# Patient Record
Sex: FEMALE | Race: WHITE | NOT HISPANIC OR LATINO | Employment: FULL TIME | URBAN - METROPOLITAN AREA
[De-identification: names, ages, dates, MRNs, and addresses within clinical notes are randomized per-mention and may not be internally consistent; named-entity substitution may affect disease eponyms.]

---

## 2019-08-21 ENCOUNTER — DOCUMENTATION (OUTPATIENT)
Dept: BARIATRICS/WEIGHT MGMT | Facility: CLINIC | Age: 19
End: 2019-08-21

## 2019-08-21 ENCOUNTER — OFFICE VISIT (OUTPATIENT)
Dept: BARIATRICS/WEIGHT MGMT | Facility: CLINIC | Age: 19
End: 2019-08-21

## 2019-08-21 VITALS
WEIGHT: 293 LBS | RESPIRATION RATE: 18 BRPM | HEART RATE: 104 BPM | TEMPERATURE: 98 F | SYSTOLIC BLOOD PRESSURE: 140 MMHG | DIASTOLIC BLOOD PRESSURE: 84 MMHG | OXYGEN SATURATION: 99 % | HEIGHT: 70 IN | BODY MASS INDEX: 41.95 KG/M2

## 2019-08-21 DIAGNOSIS — E66.9 DIABETES MELLITUS TYPE 2 IN OBESE (HCC): ICD-10-CM

## 2019-08-21 DIAGNOSIS — K21.9 GASTROESOPHAGEAL REFLUX DISEASE, ESOPHAGITIS PRESENCE NOT SPECIFIED: ICD-10-CM

## 2019-08-21 DIAGNOSIS — I10 ESSENTIAL HYPERTENSION: ICD-10-CM

## 2019-08-21 DIAGNOSIS — E66.01 OBESITY, CLASS III, BMI 40-49.9 (MORBID OBESITY) (HCC): ICD-10-CM

## 2019-08-21 DIAGNOSIS — R53.83 FATIGUE, UNSPECIFIED TYPE: Primary | ICD-10-CM

## 2019-08-21 DIAGNOSIS — M19.90 OSTEOARTHRITIS, UNSPECIFIED OSTEOARTHRITIS TYPE, UNSPECIFIED SITE: ICD-10-CM

## 2019-08-21 DIAGNOSIS — M54.9 BACK PAIN, UNSPECIFIED BACK LOCATION, UNSPECIFIED BACK PAIN LATERALITY, UNSPECIFIED CHRONICITY: ICD-10-CM

## 2019-08-21 DIAGNOSIS — R10.13 DYSPEPSIA: ICD-10-CM

## 2019-08-21 DIAGNOSIS — E11.69 DIABETES MELLITUS TYPE 2 IN OBESE (HCC): ICD-10-CM

## 2019-08-21 PROCEDURE — 99205 OFFICE O/P NEW HI 60 MIN: CPT | Performed by: SURGERY

## 2019-08-21 RX ORDER — SODIUM CHLORIDE 9 MG/ML
150 INJECTION, SOLUTION INTRAVENOUS CONTINUOUS
Status: CANCELLED | OUTPATIENT
Start: 2019-08-21

## 2019-08-21 RX ORDER — ESCITALOPRAM OXALATE 10 MG/1
10 TABLET ORAL DAILY
Refills: 0 | COMMUNITY
Start: 2019-07-27

## 2019-08-21 NOTE — PROGRESS NOTES
[facility]  [address]  [facility] [address]  [phone]      Patient  Name:  Shavon Martinez  :  2000      Date of Visit: 2019      Chief Complaint:  weight gain; unable to maintain weight loss    History of Present Illness:  Shavon Martinez is a 19 y.o. female who presents today for evaluation, education and consultation regarding weight loss surgery. The patient is interested in sleeve gastrectomy.     Her mother had a gastric bypass at [facility] in  and was hospitalized for 1 week in the ICU.  The patient describes large amount of intraoperative blood loss and an issue with the stapler that caused her esophagus to narrow.  She had some weight loss, but did not achieve non-obese BMI.  Reports her mom is non-compliant with vitamins.      Shavon has been overweight for at least 20 years, has been 35 pounds or more overweight for at least 20 years, has been 100 pounds or more overweight for 6 or more years and started dieting at age 11.  The patient describes their eating habits as volume eater, emotional eater, snacker, skips meals.      Previous diet attempts include: keto, stopping pop, Adipex, Slim Fast, intermittent fasting.  The most weight Shavon lost was 35 pounds on Adipex but was only able to maintain that weight loss for a short time.  Her maximum lifetime weight is 350 pounds.    As above, patient has been overweight for many years, with numerous failed dietary/weight loss attempts.  She now has obesity related comorbidities and as such has decided to pursue weight loss surgery.    All past medical, surgical, social and family history have been obtained and discussed as pertinent to bariatric surgery as below.     No personal or family hx of VTE/clotting disorder.    No hx of lung/liver/heart/lung problems.    ++GERD.  No meds and no prior workup.      Hx HTN, DM2, and kidney stones.    Past Medical History:   Diagnosis Date  "  • Allergic reaction     rash on face, happens about 1x per month   • Anxiety    • Back pain     occasional Aleve, ibuprofen, Tylenol   • Depression    • Fatigue    • GERD (gastroesophageal reflux disease)     \"all the time\" tx with milk and peppermints   • Gestational diabetes    • Hypertension    • Nephrolithiasis     a few episodes, better since off pop/caffeine   • Osteoarthritis    • Pregnancy induced hypertension    • Type 2 diabetes mellitus (CMS/HCC)     on metformin.  Dx with prediabetes in middle school.     Past Surgical History:   Procedure Laterality Date   •  SECTION  2018   • LAPAROSCOPIC CHOLECYSTECTOMY      poor function, Dr. García   • MOUTH SURGERY      as a child, unsure what she had done       No Known Allergies    Current Outpatient Medications:   •  escitalopram (LEXAPRO) 10 MG tablet, Take 10 mg by mouth Daily., Disp: , Rfl: 0  •  levonorgestrel (MIRENA, 52 MG,) 20 MCG/24HR IUD, Mirena 20 mcg/24 hours (5 yrs) 52 mg intrauterine device  Take by intrauterine route., Disp: , Rfl:   •  metFORMIN (GLUCOPHAGE) 500 MG tablet, Take 500 mg by mouth 2 (Two) Times a Day With Meals., Disp: , Rfl: 0    Social History     Socioeconomic History   • Marital status: Single     Spouse name: Not on file   • Number of children: Not on file   • Years of education: Not on file   • Highest education level: Not on file   Tobacco Use   • Smoking status: Never Smoker   • Smokeless tobacco: Never Used   • Tobacco comment: not around secondhand smoke in house or car   Substance and Sexual Activity   • Alcohol use: Yes     Frequency: Monthly or less     Drinks per session: 1 or 2   • Drug use: No   Social History Narrative    Stay at home mom, studying for Associate's degree in science.  Lives with parents, sister, her child, her significant other.     Family History   Problem Relation Age of Onset   • Heart attack Maternal Grandmother 52       Review of Systems   Constitutional: Positive for fatigue and " unexpected weight gain. Negative for chills, diaphoresis, fever and unexpected weight loss.   HENT: Negative for congestion and facial swelling.    Eyes: Negative for blurred vision, double vision and discharge.   Respiratory: Negative for chest tightness, shortness of breath and stridor.    Cardiovascular: Negative for chest pain, palpitations and leg swelling.   Gastrointestinal: Negative for blood in stool.   Endocrine: Negative for polydipsia.   Genitourinary: Negative for hematuria.   Musculoskeletal: Positive for arthralgias.   Skin: Negative for color change.   Allergic/Immunologic: Negative for immunocompromised state.   Neurological: Negative for confusion.   Psychiatric/Behavioral: Negative for self-injury.        Physical Exam:  Vital Signs:  Weight: (!) 142 kg (314 lb)   Body mass index is 45.05 kg/m².  Temp: 98 °F (36.7 °C)   Heart Rate: 104   BP: 140/84     Physical Exam   Constitutional: She is oriented to person, place, and time. She appears well-developed and well-nourished.   HENT:   Head: Normocephalic and atraumatic.   Nose: Nose normal.   Eyes: Conjunctivae and EOM are normal. Pupils are equal, round, and reactive to light.   Neck: Normal range of motion. Neck supple. Carotid bruit is not present. No tracheal deviation present. No thyromegaly present.   Cardiovascular: Normal rate, regular rhythm and normal heart sounds.   Pulmonary/Chest: Effort normal and breath sounds normal. No respiratory distress.   Abdominal: Soft. She exhibits no distension. There is no hepatosplenomegaly. There is no tenderness.   Lap scars, low transverse C section   Musculoskeletal: Normal range of motion. She exhibits no edema or deformity.   Neurological: She is alert and oriented to person, place, and time. No cranial nerve deficit. Coordination normal.   Skin: Skin is warm and dry. No rash noted.   Psychiatric: She has a normal mood and affect. Her behavior is normal. Judgment and thought content normal.   Vitals  reviewed.      There is no problem list on file for this patient.      Assessment:    Shavon Martinez is a 19 y.o. year old female with medically complicated obesity pursuing sleeve gastrectomy.    Weight loss surgery is deemed medically necessary given the following obesity related comorbidities including diabetes, hypertension, osteoarthritis, back pain, GERD and depression with current Weight: (!) 142 kg (314 lb) and Body mass index is 45.05 kg/m²..    She is a good candidate for weight loss surgery pending further evaluation.    Plan:  The consultation plan and program requirements were reviewed with the patient.  The patient has been advised that a letter of medical support must be obtained from her primary care physician or referring provider. A psychological evaluation will be arranged.  A nutritional evaluation will be performed.  The patient was advised to start a high protein and low carbohydrate diet.  Necessary lifestyle modifications were discussed.  Instructions on how to access RAUL was given to the patient.  RAUL is an internet based educational video that explains the surgical procedure chosen and answers basic questions regarding that procedure.     Preoperative testing will include: CBC, CMP, Lipids, TSH, HgA1C, H.Pylori, CXR, EKG and EGD     Preop clearances required prior to surgery will include Cardiac.      Patient understands that bariatric surgery is not cosmetic surgery but rather a tool to help make a lifelong commitment to lifestyle changes including diet, exercise, behavior modifications, and healthy habits.  The patient has been educated on expected postoperative lifestyle changes, including commitment to high protein diet, vitamin regimen, and exercise program.  They are aware that support groups are encouraged for optimal weight loss results.        MDM high:  Elective procedure with the following risk factors: morbid obesity, DM2, HTN  4+ chronic medical problems reviewed.    EGD  with biopsy.  Pt instructed to adhere to NPO after midnight, full liquids for 24 hours before procedure.      The risks and benefits of the upper endoscopy were discussed with the patient in detail and all questions were answered.  Possibility of perforation, bleeding, aspiration, and anesthesia reaction were reviewed.  Patient agrees to proceed.          Melissa Vergara MD

## 2019-08-21 NOTE — PROGRESS NOTES
Weight Loss Surgery  Presurgical Nutrition Assessment     Shavon Martinez  08/21/2019  [id number]  [id number]  2000  female    Surgery desired: Sleeve Gastrectomy    Weight: (!) 142 kg (314 lb)   Body mass index is 45.05 kg/m².  No past medical history on file.  No past surgical history on file.  No Known Allergies    Current Outpatient Medications:   •  escitalopram (LEXAPRO) 10 MG tablet, Take 10 mg by mouth Daily., Disp: , Rfl: 0  •  levonorgestrel (MIRENA, 52 MG,) 20 MCG/24HR IUD, Mirena 20 mcg/24 hours (5 yrs) 52 mg intrauterine device  Take by intrauterine route., Disp: , Rfl:   •  metFORMIN (GLUCOPHAGE) 500 MG tablet, Take 500 mg by mouth 2 (Two) Times a Day With Meals., Disp: , Rfl: 0      Nutrition Assessment    Estimated energy needs: 2500    Estimated calories for weight loss: 1900    IBW (Pounds):  190      Excess body weight (Pounds): 124       Nutrition Recall  24 Hour recall: (B) (L) (D) -  Reviewed and discussed with patient       Exercise  rarely      Education    Provided manual for Sleeve Gastrectomy and reviewed necessary vitamin and protein supplementation for surgery.     Provided follow-up options for support, including contact information for dietitians here, if desired.  Web-based support information and apps for smart phones and computers given.  Noted that support group is offered at this clinic, and that support is associated with weight loss.    Recommend that team follow per protocol.      Nutrition Goals   Dietary Guidelines per manual  Protein goal:  grams per day     Exercise Goals  Add 15-30 minutes of activity per day as tolerated        Nina Finn RD  08/21/2019  10:48 AM

## 2019-08-22 LAB
ALBUMIN SERPL-MCNC: 4.2 G/DL (ref 3.5–5.5)
ALBUMIN/GLOB SERPL: 1.6 {RATIO} (ref 1.2–2.2)
ALP SERPL-CCNC: 116 IU/L (ref 39–117)
ALT SERPL-CCNC: 20 IU/L (ref 0–32)
AST SERPL-CCNC: 17 IU/L (ref 0–40)
BASOPHILS # BLD AUTO: 0 X10E3/UL (ref 0–0.2)
BASOPHILS NFR BLD AUTO: 0 %
BILIRUB SERPL-MCNC: <0.2 MG/DL (ref 0–1.2)
BUN SERPL-MCNC: 10 MG/DL (ref 6–20)
BUN/CREAT SERPL: 18 (ref 9–23)
CALCIUM SERPL-MCNC: 9.6 MG/DL (ref 8.7–10.2)
CHLORIDE SERPL-SCNC: 104 MMOL/L (ref 96–106)
CHOLEST SERPL-MCNC: 165 MG/DL (ref 100–199)
CO2 SERPL-SCNC: 20 MMOL/L (ref 20–29)
CREAT SERPL-MCNC: 0.55 MG/DL (ref 0.57–1)
EOSINOPHIL # BLD AUTO: 0.3 X10E3/UL (ref 0–0.4)
EOSINOPHIL NFR BLD AUTO: 2 %
ERYTHROCYTE [DISTWIDTH] IN BLOOD BY AUTOMATED COUNT: 15 % (ref 12.3–15.4)
GLOBULIN SER CALC-MCNC: 2.6 G/DL (ref 1.5–4.5)
GLUCOSE SERPL-MCNC: 93 MG/DL (ref 65–99)
HBA1C MFR BLD: 5.7 % (ref 4.8–5.6)
HCT VFR BLD AUTO: 42.1 % (ref 34–46.6)
HDLC SERPL-MCNC: 51 MG/DL
HGB BLD-MCNC: 13.6 G/DL (ref 11.1–15.9)
IMM GRANULOCYTES # BLD AUTO: 0 X10E3/UL (ref 0–0.1)
IMM GRANULOCYTES NFR BLD AUTO: 0 %
LDLC SERPL CALC-MCNC: 97 MG/DL (ref 0–99)
LYMPHOCYTES # BLD AUTO: 4.9 X10E3/UL (ref 0.7–3.1)
LYMPHOCYTES NFR BLD AUTO: 30 %
MCH RBC QN AUTO: 25.6 PG (ref 26.6–33)
MCHC RBC AUTO-ENTMCNC: 32.3 G/DL (ref 31.5–35.7)
MCV RBC AUTO: 79 FL (ref 79–97)
MONOCYTES # BLD AUTO: 0.9 X10E3/UL (ref 0.1–0.9)
MONOCYTES NFR BLD AUTO: 6 %
NEUTROPHILS # BLD AUTO: 10.5 X10E3/UL (ref 1.4–7)
NEUTROPHILS NFR BLD AUTO: 62 %
PLATELET # BLD AUTO: 324 X10E3/UL (ref 150–450)
POTASSIUM SERPL-SCNC: 4.9 MMOL/L (ref 3.5–5.2)
PROT SERPL-MCNC: 6.8 G/DL (ref 6–8.5)
RBC # BLD AUTO: 5.31 X10E6/UL (ref 3.77–5.28)
SODIUM SERPL-SCNC: 138 MMOL/L (ref 134–144)
TRIGL SERPL-MCNC: 83 MG/DL (ref 0–149)
TSH SERPL DL<=0.005 MIU/L-ACNC: 3.91 UIU/ML (ref 0.45–4.5)
UREA BREATH TEST QL: NEGATIVE
VLDLC SERPL CALC-MCNC: 17 MG/DL (ref 5–40)
WBC # BLD AUTO: 16.7 X10E3/UL (ref 3.4–10.8)

## 2019-08-28 ENCOUNTER — CONSULT (OUTPATIENT)
Dept: CARDIOLOGY | Facility: CLINIC | Age: 19
End: 2019-08-28

## 2019-08-28 VITALS
SYSTOLIC BLOOD PRESSURE: 122 MMHG | DIASTOLIC BLOOD PRESSURE: 88 MMHG | HEIGHT: 70 IN | HEART RATE: 93 BPM | OXYGEN SATURATION: 97 % | WEIGHT: 293 LBS | BODY MASS INDEX: 41.95 KG/M2

## 2019-08-28 DIAGNOSIS — Z01.811 PRE-OP CHEST EXAM: Primary | ICD-10-CM

## 2019-08-28 PROCEDURE — 99243 OFF/OP CNSLTJ NEW/EST LOW 30: CPT | Performed by: PHYSICIAN ASSISTANT

## 2019-08-28 PROCEDURE — 93000 ELECTROCARDIOGRAM COMPLETE: CPT | Performed by: PHYSICIAN ASSISTANT

## 2019-08-28 NOTE — PROGRESS NOTES
[facility]  INITIAL OFFICE CONSULT      Shavon Martinez  2000  PCP: Manuel Schmidt MD    SUBJECTIVE:   Shavon Martinez is a 19 y.o. female seen for a consultation visit regarding the following:     Chief Complaint:   Chief Complaint   Patient presents with   • Bariatric clearance     CONSULT          Consultation is requested by Melissa Vergara MD for evaluation of Bariatric clearance (CONSULT)        History:  19-year-old female seen in consultation regarding need for cardiac evaluation prior to bariatric surgery.  The patient denies any previous cardiac history.  She denies any hospitalization with exception of her  for her child over a year ago and gallbladder surgery when she was 13 years old.  She states she did well with these 2 surgeries.  She reports that after her child was born she her weight peaked at over 350 pounds.  Despite dieting and trying to increase her activity levels she continues to have difficulty with losing weight she is now pursuing bariatric surgery.  She reports that she has had elevated blood pressure times but currently not taking medications.  She is also been diagnosed with prediabetes and actually is on metformin therapy at this time.  Her last HbA1c was 5.7.  She reports no episodes of chest pain or chest tightness suggesting angina pectoris.  She denies any heart failure symptoms such as orthopnea PND peripheral knee.  She denies any arrhythmia issues,dizziness, near syncope, or syncope.  She states she does not exercise all that regular basis that she is taking care of her 1-year-old child but she does do a lot of her own housework is able to run up a flight of stairs vacuum and routine work with not any symptoms of chest pain or shortness of breath.  She is scheduled to start a new protein diet as per protocol with the bariatric surgery and looks to schedule this surgery in the near future.      Cardiac PMH: (Old  "records have been reviewed and summarized below)  1. Pre HTN SBP 130mmHg  2. DM Type II:  HbA1c 5.7.        Past Medical History, Past Surgical History, Family history, Social History, and Medications were all reviewed with the patient today and updated as necessary.     Current Outpatient Medications   Medication Sig Dispense Refill   • escitalopram (LEXAPRO) 10 MG tablet Take 10 mg by mouth Daily.  0   • levonorgestrel (MIRENA, 52 MG,) 20 MCG/24HR IUD Mirena 20 mcg/24 hours (5 yrs) 52 mg intrauterine device   Take by intrauterine route.     • metFORMIN (GLUCOPHAGE) 500 MG tablet Take 500 mg by mouth 2 (Two) Times a Day With Meals.  0     No current facility-administered medications for this visit.      No Known Allergies      Past Medical History:   Diagnosis Date   • Allergic reaction     rash on face, happens about 1x per month   • Anxiety    • Back pain     occasional Aleve, ibuprofen, Tylenol   • Depression    • Fatigue    • GERD (gastroesophageal reflux disease)     \"all the time\" tx with milk and peppermints   • Gestational diabetes    • Hypertension    • Nephrolithiasis     a few episodes, better since off pop/caffeine   • Pregnancy induced hypertension    • Type 2 diabetes mellitus (CMS/HCC)     on metformin.  Dx with prediabetes in middle school.     Past Surgical History:   Procedure Laterality Date   •  SECTION  2018   • LAPAROSCOPIC CHOLECYSTECTOMY      poor function, Dr. García   • MOUTH SURGERY      as a child, unsure what she had done   • TONSILLECTOMY       Family History   Problem Relation Age of Onset   • Heart attack Maternal Grandmother 52   • Hypertension Father    • Hypertension Brother      Social History     Tobacco Use   • Smoking status: Never Smoker   • Smokeless tobacco: Never Used   • Tobacco comment: not around secondhand smoke in house or car   Substance Use Topics   • Alcohol use: Yes     Alcohol/week: 0.6 - 1.2 oz     Types: 1 - 2 Glasses of wine per week     " "Frequency: Monthly or less     Drinks per session: 1 or 2     Comment: monthly       ROS:  Review of Symptoms:  General: no recent weight loss/gain, weakness or fatigue  Skin: no rashes, lumps, or other skin changes  HEENT: no dizziness, lightheadedness, or vision changes  Respiratory: no cough or hemoptysis  Cardiovascular: no palpitations, and tachycardia  Gastrointestinal: no black/tarry stools or diarrhea  Urinary: no change in frequency or urgency  Peripheral Vascular: no claudication or leg cramps  Musculoskeletal: no muscle or joint pain/stiffness  Psychiatric: no depression or excessive stress  Neurological: no sensory or motor loss, no syncope  Hematologic: no anemia, easy bruising or bleeding  Endocrine: no thyroid problems, nor heat or cold intolerance         PHYSICAL EXAM:   /88 (BP Location: Left arm, Patient Position: Sitting)   Pulse 93   Ht 177.8 cm (70\")   Wt (!) 144 kg (316 lb 12.8 oz)   SpO2 97%   BMI 45.46 kg/m²      Wt Readings from Last 5 Encounters:   08/28/19 (!) 144 kg (316 lb 12.8 oz)   08/21/19 (!) 142 kg (314 lb)     BP Readings from Last 5 Encounters:   08/28/19 122/88   08/21/19 140/84       General-Well Nourished, Well developed  Eyes - PERRLA  Neck- supple, No mass  CV- regular rate and rhythm, no MRG  Lung- clear bilaterally  Abd- soft, +BS  Musc/skel - Norm strength and range of motion  Skin- warm and dry  Neuro - Alert & Oriented x 3, appropriate mood.    Medical problems and test results were reviewed with the patient today.     Results for orders placed or performed in visit on 08/21/19   H. Pylori Breath Test - Breath, Lung   Result Value Ref Range    H. pylori Breath Test Negative Negative   Comprehensive Metabolic Panel   Result Value Ref Range    Glucose 93 65 - 99 mg/dL    BUN 10 6 - 20 mg/dL    Creatinine 0.55 (L) 0.57 - 1.00 mg/dL    eGFR Non African Am 135 >59 mL/min/1.73    eGFR African Am 155 >59 mL/min/1.73    BUN/Creatinine Ratio 18 9 - 23    Sodium 138 " 134 - 144 mmol/L    Potassium 4.9 3.5 - 5.2 mmol/L    Chloride 104 96 - 106 mmol/L    Total CO2 20 20 - 29 mmol/L    Calcium 9.6 8.7 - 10.2 mg/dL    Total Protein 6.8 6.0 - 8.5 g/dL    Albumin 4.2 3.5 - 5.5 g/dL    Globulin 2.6 1.5 - 4.5 g/dL    A/G Ratio 1.6 1.2 - 2.2    Total Bilirubin <0.2 0.0 - 1.2 mg/dL    Alkaline Phosphatase 116 39 - 117 IU/L    AST (SGOT) 17 0 - 40 IU/L    ALT (SGPT) 20 0 - 32 IU/L   TSH   Result Value Ref Range    TSH 3.910 0.450 - 4.500 uIU/mL   Lipid Panel   Result Value Ref Range    Total Cholesterol 165 100 - 199 mg/dL    Triglycerides 83 0 - 149 mg/dL    HDL Cholesterol 51 >39 mg/dL    VLDL Cholesterol 17 5 - 40 mg/dL    LDL Cholesterol  97 0 - 99 mg/dL   Hemoglobin A1c   Result Value Ref Range    Hemoglobin A1C 5.7 (H) 4.8 - 5.6 %   CBC & Differential   Result Value Ref Range    WBC 16.7 (H) 3.4 - 10.8 x10E3/uL    RBC 5.31 (H) 3.77 - 5.28 x10E6/uL    Hemoglobin 13.6 11.1 - 15.9 g/dL    Hematocrit 42.1 34.0 - 46.6 %    MCV 79 79 - 97 fL    MCH 25.6 (L) 26.6 - 33.0 pg    MCHC 32.3 31.5 - 35.7 g/dL    RDW 15.0 12.3 - 15.4 %    Platelets 324 150 - 450 x10E3/uL    Neutrophil Rel % 62 Not Estab. %    Lymphocyte Rel % 30 Not Estab. %    Monocyte Rel % 6 Not Estab. %    Eosinophil Rel % 2 Not Estab. %    Basophil Rel % 0 Not Estab. %    Neutrophils Absolute 10.5 (H) 1.4 - 7.0 x10E3/uL    Lymphocytes Absolute 4.9 (H) 0.7 - 3.1 x10E3/uL    Monocytes Absolute 0.9 0.1 - 0.9 x10E3/uL    Eosinophils Absolute 0.3 0.0 - 0.4 x10E3/uL    Basophils Absolute 0.0 0.0 - 0.2 x10E3/uL    Immature Granulocyte Rel % 0 Not Estab. %    Immature Grans Absolute 0.0 0.0 - 0.1 x10E3/uL         Lab Results   Component Value Date    HDL 51 08/21/2019    LDL 97 08/21/2019    VLDL 17 08/21/2019       EKG:  (EKG/Tracing has been independently visualized by me and summarized below)      ECG 12 Lead  Date/Time: 8/28/2019 2:16 PM  Performed by: Phani Charlton PA  Authorized by: Phani Charlton PA   Comparison: not  compared with previous ECG   Rhythm: sinus rhythm  Rate: normal  Conduction: conduction normal  ST Segments: ST segments normal  T Waves: T waves normal  QRS axis: normal            ASSESSMENT   1. Pre op evaluation for Bariatric surgery:  Normal EKg, No prior cardiac history with no symptoms of angina, CHF, Palpitations.   2. Pre HTN: Follow DASH diet.  Increase exercise  3. DM Type II    PLAN  · The patient has a normal EKG no's cardiac symptoms.  She would consider standard cardiovascular risk to pursue bariatric surgery.  · Continue to focus on risk factor modification including following DASH diet increase exercise and monitor closely diabetes.  · Return for follow-up as needed basis.            Cardiology/Electrophysiology  08/28/19  10:03 AM  Will Latesha CORREA

## 2019-09-18 ENCOUNTER — LAB REQUISITION (OUTPATIENT)
Dept: LAB | Facility: HOSPITAL | Age: 19
End: 2019-09-18

## 2019-09-18 DIAGNOSIS — K21.9 GASTRO-ESOPHAGEAL REFLUX DISEASE WITHOUT ESOPHAGITIS: ICD-10-CM

## 2019-09-18 PROCEDURE — 88305 TISSUE EXAM BY PATHOLOGIST: CPT | Performed by: SURGERY

## 2019-09-19 LAB
CYTO UR: NORMAL
LAB AP CASE REPORT: NORMAL
LAB AP CLINICAL INFORMATION: NORMAL
PATH REPORT.FINAL DX SPEC: NORMAL
PATH REPORT.GROSS SPEC: NORMAL

## 2021-03-23 ENCOUNTER — BULK ORDERING (OUTPATIENT)
Dept: CASE MANAGEMENT | Facility: OTHER | Age: 21
End: 2021-03-23

## 2021-03-23 DIAGNOSIS — Z23 IMMUNIZATION DUE: ICD-10-CM

## 2022-11-17 NOTE — PROGRESS NOTES
"Patient: Shavon Martinez    YOB: 2000    Date: 11/18/2022    Primary Care Provider: Irina Mcmahon PA    Chief Complaint   Patient presents with   • Hernia     Hiatal       SUBJECTIVE:    History of present illness:  I saw the patient in the office  today as a consultation for evaluation and treatment of heartburn.  Patient had an EGD a few years ago when she was considering a gastric sleeve, she had a hiatal hernia at that time with no esophagitis.  She did not undergo gastric sleeve resection.  Patient continues to gain weight and have significant heart\" worse.  Patient is on PPI medication daily.  Avoid caffeine alcohol and nicotine.    The following portions of the patient's history were reviewed and updated as appropriate: allergies, current medications, past family history, past medical history, past social history, past surgical history and problem list.    Review of Systems   Constitutional: Negative for chills, fever and unexpected weight change.   HENT: Negative for hearing loss, trouble swallowing and voice change.    Eyes: Negative for visual disturbance.   Respiratory: Negative for apnea, cough, chest tightness, shortness of breath and wheezing.    Cardiovascular: Negative for chest pain, palpitations and leg swelling.   Gastrointestinal: Negative for abdominal distention, abdominal pain, anal bleeding, blood in stool, constipation, diarrhea, nausea, rectal pain and vomiting.   Endocrine: Negative for cold intolerance and heat intolerance.   Genitourinary: Negative for difficulty urinating, dysuria and flank pain.   Musculoskeletal: Negative for back pain and gait problem.   Skin: Negative for color change, rash and wound.   Neurological: Negative for dizziness, syncope, speech difficulty, weakness, light-headedness, numbness and headaches.   Hematological: Negative for adenopathy. Does not bruise/bleed easily.   Psychiatric/Behavioral: Negative for confusion. The patient is not " "nervous/anxious.        History:  Past Medical History:   Diagnosis Date   • Allergic reaction     rash on face, happens about 1x per month   • Anxiety    • Back pain     occasional Aleve, ibuprofen, Tylenol   • Depression    • Fatigue    • GERD (gastroesophageal reflux disease)     \"all the time\" tx with milk and peppermints   • Gestational diabetes    • Hypertension    • Nephrolithiasis     a few episodes, better since off pop/caffeine   • Pregnancy induced hypertension    • Type 2 diabetes mellitus (HCC)     on metformin.  Dx with prediabetes in middle school.       Past Surgical History:   Procedure Laterality Date   •  SECTION  2018   • LAPAROSCOPIC CHOLECYSTECTOMY      poor function, Dr. García   • MOUTH SURGERY      as a child, unsure what she had done   • TONSILLECTOMY         Family History   Problem Relation Age of Onset   • Heart attack Maternal Grandmother 52   • Hypertension Father    • Hypertension Brother        Social History     Tobacco Use   • Smoking status: Never   • Smokeless tobacco: Never   • Tobacco comments:     not around secondhand smoke in house or car   Substance Use Topics   • Alcohol use: Yes     Alcohol/week: 1.0 - 2.0 standard drink     Types: 1 - 2 Glasses of wine per week     Comment: monthly   • Drug use: No       Allergies:  No Known Allergies    Medications:    Current Outpatient Medications:   •  levonorgestrel (MIRENA) 20 MCG/24HR IUD, Mirena 20 mcg/24 hours (5 yrs) 52 mg intrauterine device  Take by intrauterine route., Disp: , Rfl:   •  escitalopram (LEXAPRO) 10 MG tablet, Take 10 mg by mouth Daily., Disp: , Rfl: 0  •  HYDROcodone-acetaminophen (NORCO)  MG per tablet, hydrocodone 10 mg-acetaminophen 325 mg tablet  TAKE 1 TABLET BY MOUTH EVERY 4 HOURS AS NEEDED FOR PAIN, Disp: , Rfl:   •  ibuprofen (ADVIL,MOTRIN) 800 MG tablet, ibuprofen 800 mg tablet  TAKE ONE TABLET EVERY 6 HOURS AS NEEDED PAIN, Disp: , Rfl:   •  metFORMIN (GLUCOPHAGE) 500 MG tablet, " "Take 500 mg by mouth 2 (Two) Times a Day With Meals., Disp: , Rfl: 0  •  ondansetron ODT (ZOFRAN-ODT) 8 MG disintegrating tablet, , Disp: , Rfl:   •  pantoprazole (PROTONIX) 40 MG EC tablet, pantoprazole 40 mg tablet,delayed release, Disp: , Rfl:   •  phentermine (ADIPEX-P) 37.5 MG tablet, phentermine 37.5 mg tablet  TAKE ONE TABLET DAILY, Disp: , Rfl:     OBJECTIVE:    Vital Signs:   Vitals:    11/18/22 1412   BP: 128/78   Pulse: 95   Temp: 97.8 °F (36.6 °C)   SpO2: 98%   Weight: 111 kg (245 lb)   Height: 177.8 cm (70\")       Physical Exam:   General Appearance:    Alert, cooperative, in no acute distress   Head:    Normocephalic, without obvious abnormality, atraumatic   Eyes:            Lids and lashes normal, conjunctivae and sclerae normal, no   icterus, no pallor, corneas clear, PERRLA   Ears:    Ears appear intact with no abnormalities noted   Throat:   No oral lesions, no thrush, oral mucosa moist   Neck:   No adenopathy, supple, trachea midline, no thyromegaly, no   carotid bruit, no JVD   Lungs:     Clear to auscultation,respirations regular, even and                  unlabored    Heart:    Regular rhythm and normal rate, normal S1 and S2, no            murmur, no gallop, no rub, no click   Chest Wall:    No abnormalities observed   Abdomen:     Normal bowel sounds, no masses, no organomegaly, soft        non-tender, non-distended, no guarding, there is evidence of epigastric  Tenderness.  No abdominal wall masses or hernias.   Extremities:   Moves all extremities well, no edema, no cyanosis, no             redness   Pulses:   Pulses palpable and equal bilaterally   Skin:   No bleeding, bruising or rash   Lymph nodes:   No palpable adenopathy   Neurologic:   Cranial nerves 2 - 12 grossly intact, sensation intact     Results Review:   I reviewed the patient's new clinical results.    Review of Systems was reviewed and confirmed as accurate as documented by the MA.    ASSESSMENT/PLAN:    1. Gastroesophageal " reflux disease, unspecified whether esophagitis present    2. Epigastric pain        I did have a detailed and extensive discussion with the patient in the office and they understand that they need to undergo upper endoscopy. Full risks and benefits of operative versus nonoperative intervention were discussed with the patient and these include bleeding and esophageal injury. The patient understands, agrees, and wishes to proceed with the surgical treatment plan as mentioned above. The patient had no questions for me at the end of the discussion.  Continue PPI medication daily, avoid caffeine alcohol and nicotine.  I would also encourage patient to revisit the prospect of undergoing gastric sleeve with Dr. Vergara.     I discussed the patients findings and my recommendations with patient.     Electronically signed by Amparo Mayo MD  11/18/22 13:57 EST

## 2022-11-18 ENCOUNTER — OFFICE VISIT (OUTPATIENT)
Dept: SURGERY | Facility: CLINIC | Age: 22
End: 2022-11-18

## 2022-11-18 VITALS
HEIGHT: 70 IN | OXYGEN SATURATION: 98 % | TEMPERATURE: 97.8 F | WEIGHT: 245 LBS | SYSTOLIC BLOOD PRESSURE: 128 MMHG | HEART RATE: 95 BPM | BODY MASS INDEX: 35.07 KG/M2 | DIASTOLIC BLOOD PRESSURE: 78 MMHG

## 2022-11-18 DIAGNOSIS — R10.13 EPIGASTRIC PAIN: ICD-10-CM

## 2022-11-18 DIAGNOSIS — K21.9 GASTROESOPHAGEAL REFLUX DISEASE, UNSPECIFIED WHETHER ESOPHAGITIS PRESENT: Primary | ICD-10-CM

## 2022-11-18 PROCEDURE — 99203 OFFICE O/P NEW LOW 30 MIN: CPT | Performed by: SURGERY

## 2022-11-18 RX ORDER — IBUPROFEN 800 MG/1
TABLET ORAL
COMMUNITY

## 2022-11-18 RX ORDER — PANTOPRAZOLE SODIUM 40 MG/1
TABLET, DELAYED RELEASE ORAL
COMMUNITY

## 2022-11-18 RX ORDER — HYDROCODONE BITARTRATE AND ACETAMINOPHEN 10; 325 MG/1; MG/1
TABLET ORAL
COMMUNITY

## 2022-11-18 RX ORDER — PHENTERMINE HYDROCHLORIDE 37.5 MG/1
TABLET ORAL
COMMUNITY

## 2022-11-18 RX ORDER — ONDANSETRON 8 MG/1
TABLET, ORALLY DISINTEGRATING ORAL
COMMUNITY
Start: 2022-09-27

## 2022-11-23 ENCOUNTER — TELEPHONE (OUTPATIENT)
Dept: SURGERY | Facility: CLINIC | Age: 22
End: 2022-11-23

## 2022-11-29 ENCOUNTER — OUTSIDE FACILITY SERVICE (OUTPATIENT)
Dept: SURGERY | Facility: CLINIC | Age: 22
End: 2022-11-29

## 2022-11-29 PROCEDURE — 43239 EGD BIOPSY SINGLE/MULTIPLE: CPT | Performed by: SURGERY

## 2022-12-06 NOTE — PROGRESS NOTES
Patient: Shavon Martinez    YOB: 2000    Date: 12/07/2022    Primary Care Provider: Irina Mcmahon PA    Reason for Consultation: Follow-up EGD    Chief Complaint   Patient presents with   • Post-op Follow-up     egd       History of present illness:  I saw the patient in the office today as a followup from their recent EGD with biopsy, the pathology report did show eosinophilic esophagitis .  They state that they have done well.  Patient continues to have heartburn symptoms.  No significant active esophagitis, significant eosinophilic esophagitis present.    The following portions of the patient's history were reviewed and updated as appropriate: allergies, current medications, past family history, past medical history, past social history, past surgical history and problem list.    Review of Systems   Constitutional: Negative for chills, fever and unexpected weight change.   HENT: Negative for hearing loss, trouble swallowing and voice change.    Eyes: Negative for visual disturbance.   Respiratory: Negative for apnea, cough, chest tightness, shortness of breath and wheezing.    Cardiovascular: Negative for chest pain, palpitations and leg swelling.   Gastrointestinal: Negative for abdominal distention, abdominal pain, anal bleeding, blood in stool, constipation, diarrhea, nausea, rectal pain and vomiting.   Endocrine: Negative for cold intolerance and heat intolerance.   Genitourinary: Negative for difficulty urinating, dysuria and flank pain.   Musculoskeletal: Negative for back pain and gait problem.   Skin: Negative for color change, rash and wound.   Neurological: Negative for dizziness, syncope, speech difficulty, weakness, light-headedness, numbness and headaches.   Hematological: Negative for adenopathy. Does not bruise/bleed easily.   Psychiatric/Behavioral: Negative for confusion. The patient is not nervous/anxious.        Vital Signs:   Vitals:    12/07/22 0931   BP: 124/88   Pulse:  "88   Resp: 14   Temp: 97 °F (36.1 °C)   SpO2: 99%   Weight: (!) 154 kg (339 lb)   Height: 180.3 cm (71\")       Allergies:  No Known Allergies    Medications:    Current Outpatient Medications:   •  HYDROcodone-acetaminophen (NORCO)  MG per tablet, hydrocodone 10 mg-acetaminophen 325 mg tablet  TAKE 1 TABLET BY MOUTH EVERY 4 HOURS AS NEEDED FOR PAIN, Disp: , Rfl:   •  ibuprofen (ADVIL,MOTRIN) 800 MG tablet, ibuprofen 800 mg tablet  TAKE ONE TABLET EVERY 6 HOURS AS NEEDED PAIN, Disp: , Rfl:   •  ibuprofen (ADVIL,MOTRIN) 800 MG tablet, Take 800 mg by mouth., Disp: , Rfl:   •  levonorgestrel (MIRENA) 20 MCG/24HR IUD, Mirena 20 mcg/24 hours (5 yrs) 52 mg intrauterine device  Take by intrauterine route., Disp: , Rfl:   •  ondansetron ODT (ZOFRAN-ODT) 8 MG disintegrating tablet, , Disp: , Rfl:   •  calcium carbonate (OS-JENS) 1250 (500 Ca) MG chewable tablet, Chew 1 tablet Daily., Disp: , Rfl:   •  ciprofloxacin (CIPRO) 500 MG tablet, TAKE 1 TABLET BY MOUTH TWICE A DAY FOR 5 DAYS, Disp: , Rfl:   •  diazePAM (VALIUM) 10 MG tablet, TAKE 1 TABLET BY MOUTH EVERY 6 HOURS AS NEEDED FOR ANXIETY (TAKE 1 TABLET 30 MINUTES BEFORE PROCEDURE) FOR UP TO 10 DAYS, Disp: , Rfl:   •  escitalopram (LEXAPRO) 10 MG tablet, Take 10 mg by mouth Daily., Disp: , Rfl: 0  •  metFORMIN (GLUCOPHAGE) 500 MG tablet, Take 500 mg by mouth 2 (Two) Times a Day With Meals., Disp: , Rfl: 0  •  oxyCODONE-acetaminophen (PERCOCET)  MG per tablet, TAKE 1 TABLET BY MOUTH EVERY 4 HOURS AS NEEDED FOR PAIN FOR UP TO 10 DAYS. MAX OF 6 TABLETS PER DAY, Disp: , Rfl:   •  pantoprazole (PROTONIX) 40 MG EC tablet, pantoprazole 40 mg tablet,delayed release, Disp: , Rfl:   •  phenazopyridine (PYRIDIUM) 100 MG tablet, Take 100 mg by mouth 3 (Three) Times a Day As Needed., Disp: , Rfl:   •  phentermine (ADIPEX-P) 37.5 MG tablet, phentermine 37.5 mg tablet  TAKE ONE TABLET DAILY, Disp: , Rfl:   •  promethazine (PHENERGAN) 25 MG tablet, Take 25 mg by mouth Every 6 " (Six) Hours As Needed. for nausea, Disp: , Rfl:     Physical Exam:   General Appearance:    Alert, cooperative, in no acute distress   Abdomen:     no masses, no organomegaly, soft and mildly tender in epigastric region.   Chest:      Clear toausculation            Cor:  Regular rate and rhythm      Results Review:   I reviewed the patient's new clinical results.    Review of Systems was reviewed and confirmed as accurate as documented by the MA.    Assessment / Plan:    1. Gastroesophageal reflux disease with esophagitis without hemorrhage    2. Epigastric pain        I did discuss the situation with the patient today in the office and they have done well from their recent EGD with biopsy. I have told the patient she will need Flonase oral suspension for treatment with eosinophilic esophagitis.  Continue PPI medication and follow-up as needed.  Patient will follow-up with bariatric surgery for possible gastric sleeve, will also consider Nissen fundoplication at the same time.    Electronically signed by Amparo Mayo MD  12/07/22

## 2022-12-07 ENCOUNTER — TELEPHONE (OUTPATIENT)
Dept: SURGERY | Facility: CLINIC | Age: 22
End: 2022-12-07

## 2022-12-07 ENCOUNTER — OFFICE VISIT (OUTPATIENT)
Dept: SURGERY | Facility: CLINIC | Age: 22
End: 2022-12-07

## 2022-12-07 VITALS
WEIGHT: 293 LBS | OXYGEN SATURATION: 99 % | DIASTOLIC BLOOD PRESSURE: 88 MMHG | TEMPERATURE: 97 F | HEART RATE: 88 BPM | BODY MASS INDEX: 41.02 KG/M2 | RESPIRATION RATE: 14 BRPM | SYSTOLIC BLOOD PRESSURE: 124 MMHG | HEIGHT: 71 IN

## 2022-12-07 DIAGNOSIS — R10.13 EPIGASTRIC PAIN: ICD-10-CM

## 2022-12-07 DIAGNOSIS — K21.00 GASTROESOPHAGEAL REFLUX DISEASE WITH ESOPHAGITIS WITHOUT HEMORRHAGE: Primary | ICD-10-CM

## 2022-12-07 PROCEDURE — 99212 OFFICE O/P EST SF 10 MIN: CPT | Performed by: SURGERY

## 2022-12-07 RX ORDER — DIAZEPAM 10 MG/1
TABLET ORAL
COMMUNITY
Start: 2022-12-03

## 2022-12-07 RX ORDER — CIPROFLOXACIN 500 MG/1
TABLET, FILM COATED ORAL
COMMUNITY
Start: 2022-12-03

## 2022-12-07 RX ORDER — PHENAZOPYRIDINE HYDROCHLORIDE 100 MG/1
100 TABLET, FILM COATED ORAL 3 TIMES DAILY PRN
COMMUNITY
Start: 2022-12-03

## 2022-12-07 RX ORDER — UREA 10 %
1 LOTION (ML) TOPICAL DAILY
COMMUNITY

## 2022-12-07 RX ORDER — PROMETHAZINE HYDROCHLORIDE 25 MG/1
25 TABLET ORAL EVERY 6 HOURS PRN
COMMUNITY
Start: 2022-12-03

## 2022-12-07 RX ORDER — OXYCODONE AND ACETAMINOPHEN 10; 325 MG/1; MG/1
TABLET ORAL
COMMUNITY
Start: 2022-12-03

## 2022-12-07 RX ORDER — IBUPROFEN 800 MG/1
800 TABLET ORAL
COMMUNITY
Start: 2022-12-02 | End: 2023-01-12